# Patient Record
Sex: MALE | Race: WHITE
[De-identification: names, ages, dates, MRNs, and addresses within clinical notes are randomized per-mention and may not be internally consistent; named-entity substitution may affect disease eponyms.]

---

## 2019-08-27 ENCOUNTER — APPOINTMENT (OUTPATIENT)
Dept: OTOLARYNGOLOGY | Facility: CLINIC | Age: 24
End: 2019-08-27
Payer: COMMERCIAL

## 2019-08-27 VITALS
DIASTOLIC BLOOD PRESSURE: 67 MMHG | RESPIRATION RATE: 15 BRPM | WEIGHT: 170 LBS | SYSTOLIC BLOOD PRESSURE: 147 MMHG | HEART RATE: 67 BPM | BODY MASS INDEX: 23.8 KG/M2 | OXYGEN SATURATION: 98 % | HEIGHT: 71 IN | TEMPERATURE: 97.8 F

## 2019-08-27 VITALS — HEIGHT: 71 IN | WEIGHT: 170 LBS | BODY MASS INDEX: 23.8 KG/M2

## 2019-08-27 DIAGNOSIS — Z86.79 PERSONAL HISTORY OF OTHER DISEASES OF THE CIRCULATORY SYSTEM: ICD-10-CM

## 2019-08-27 DIAGNOSIS — Z78.9 OTHER SPECIFIED HEALTH STATUS: ICD-10-CM

## 2019-08-27 DIAGNOSIS — Z87.09 PERSONAL HISTORY OF OTHER DISEASES OF THE RESPIRATORY SYSTEM: ICD-10-CM

## 2019-08-27 DIAGNOSIS — R06.83 SNORING: ICD-10-CM

## 2019-08-27 DIAGNOSIS — I51.9 HEART DISEASE, UNSPECIFIED: ICD-10-CM

## 2019-08-27 DIAGNOSIS — G47.9 SLEEP DISORDER, UNSPECIFIED: ICD-10-CM

## 2019-08-27 DIAGNOSIS — J34.2 DEVIATED NASAL SEPTUM: ICD-10-CM

## 2019-08-27 PROCEDURE — 31575 DIAGNOSTIC LARYNGOSCOPY: CPT

## 2019-08-27 PROCEDURE — 99203 OFFICE O/P NEW LOW 30 MIN: CPT | Mod: 25

## 2019-08-29 ENCOUNTER — APPOINTMENT (OUTPATIENT)
Dept: SLEEP CENTER | Facility: HOME HEALTH | Age: 24
End: 2019-08-29

## 2019-08-30 ENCOUNTER — OTHER (OUTPATIENT)
Age: 24
End: 2019-08-30

## 2019-08-30 ENCOUNTER — APPOINTMENT (OUTPATIENT)
Dept: SLEEP CENTER | Facility: HOME HEALTH | Age: 24
End: 2019-08-30
Payer: COMMERCIAL

## 2019-08-30 ENCOUNTER — OUTPATIENT (OUTPATIENT)
Dept: OUTPATIENT SERVICES | Facility: HOSPITAL | Age: 24
LOS: 1 days | End: 2019-08-30
Payer: COMMERCIAL

## 2019-08-30 DIAGNOSIS — G47.33 OBSTRUCTIVE SLEEP APNEA (ADULT) (PEDIATRIC): ICD-10-CM

## 2019-08-30 PROCEDURE — 95800 SLP STDY UNATTENDED: CPT

## 2019-08-30 PROCEDURE — 95800 SLP STDY UNATTENDED: CPT | Mod: 26

## 2019-09-03 DIAGNOSIS — G47.33 OBSTRUCTIVE SLEEP APNEA (ADULT) (PEDIATRIC): ICD-10-CM

## 2019-09-03 NOTE — HISTORY OF PRESENT ILLNESS
[de-identified] : 24 years old male patient with history of Persistent snoring and sleep disturbance for the past 6 years.  Patient is present today in the with C/O Snoring loud enough to disturb his sleep or that of others.  Awakening with a dry mouth or sore throat.  Loud snoring. Medium to Large Tonsils,  Lingual tonsil Hypertrophy, GERD injury, Turbinate Hypertrophy \par

## 2019-09-03 NOTE — REASON FOR VISIT
[Initial Consultation] : an initial consultation for [FreeTextEntry2] : Persistent snoring and sleep disturbance for the past 6 years.  Patient states his level of severity  is a level 8 out of 10 and it occurs constant.  Patient states nothing helps to improve or worsens his Persistent snoring and sleep disturbance for the past 6 years.

## 2019-09-03 NOTE — CONSULT LETTER
[Dear  ___] : Dear  [unfilled], [Consult Letter:] : I had the pleasure of evaluating your patient, [unfilled]. [Please see my note below.] : Please see my note below. [Consult Closing:] : Thank you very much for allowing me to participate in the care of this patient.  If you have any questions, please do not hesitate to contact me. [Sincerely,] : Sincerely, [DrMusa  ___] : Dr. BROWN [FreeTextEntry3] : Bruno Whitmore MD, FACS\par Professor of Otolaryngology, [employer] [facility] [employer] at [facility]\par Director, [facility], [facility]\par , [facility], [facility]\par

## 2019-09-03 NOTE — REVIEW OF SYSTEMS
[Patient Intake Form Reviewed] : Patient intake form was reviewed [Nasal Congestion] : nasal congestion [Problem Snoring] : problem snoring [As Noted in HPI] : as noted in HPI [Negative] : Endocrine [FreeTextEntry4] : Neck pain

## 2019-09-03 NOTE — PHYSICAL EXAM
[] : septum deviated bilaterally [Normal] : normal appearance, well groomed, well nourished, and in no acute distress [de-identified] :  Medium to Large Tonsils,  Lingual tonsil Hypertrophy, GERD injury, Turbinate Hypertrophy \par

## 2019-09-03 NOTE — PROCEDURE
[Deviated to the Lt] : deviated to the left [Congested] : congested [Image(s) Captured] : image(s) captured and filed [Flexible Endoscope] : examined with the flexible endoscope [Serial Number: ___] : Serial Number: [unfilled] [Topical Lidocaine] : topical lidocaine [Glottis Arytenoid Cartilages Erythema] : bilateral arytenoid ~M erythema [de-identified] :  Loud snoring. Medium to Large Tonsils,  Lingual tonsil Hypertrophy, GERD injury, Turbinate Hypertrophy  [de-identified] : Lingual Tonsil Hypertrophy

## 2019-09-06 PROBLEM — G47.33 OBSTRUCTIVE SLEEP APNEA: Status: ACTIVE | Noted: 2019-08-27

## 2020-03-11 ENCOUNTER — TRANSCRIPTION ENCOUNTER (OUTPATIENT)
Age: 25
End: 2020-03-11

## 2022-12-01 ENCOUNTER — APPOINTMENT (OUTPATIENT)
Dept: HUMAN REPRODUCTION | Facility: CLINIC | Age: 27
End: 2022-12-01

## 2023-05-05 ENCOUNTER — APPOINTMENT (OUTPATIENT)
Dept: OTOLARYNGOLOGY | Facility: CLINIC | Age: 28
End: 2023-05-05
Payer: COMMERCIAL

## 2023-05-05 ENCOUNTER — NON-APPOINTMENT (OUTPATIENT)
Age: 28
End: 2023-05-05

## 2023-05-05 VITALS
SYSTOLIC BLOOD PRESSURE: 118 MMHG | TEMPERATURE: 97.9 F | DIASTOLIC BLOOD PRESSURE: 80 MMHG | BODY MASS INDEX: 30.21 KG/M2 | HEART RATE: 77 BPM | WEIGHT: 160 LBS | HEIGHT: 61 IN

## 2023-05-05 DIAGNOSIS — H69.82 OTHER SPECIFIED DISORDERS OF EUSTACHIAN TUBE, LEFT EAR: ICD-10-CM

## 2023-05-05 DIAGNOSIS — H61.23 IMPACTED CERUMEN, BILATERAL: ICD-10-CM

## 2023-05-05 DIAGNOSIS — H92.02 OTALGIA, LEFT EAR: ICD-10-CM

## 2023-05-05 PROCEDURE — 31231 NASAL ENDOSCOPY DX: CPT

## 2023-05-05 PROCEDURE — 99204 OFFICE O/P NEW MOD 45 MIN: CPT | Mod: 25

## 2023-05-05 PROCEDURE — 69210 REMOVE IMPACTED EAR WAX UNI: CPT

## 2023-05-05 NOTE — END OF VISIT
[FreeTextEntry3] : I, Dr. Aguilar personally performed the evaluation and management (E/M) services including all necessary procedures, for this new patient. That E/M includes conducting the clinically appropriate initial history &/or exam, assessing all conditions, and establishing the plan of care. Today, my POWER, Karmen Gonzalez, was here to observe &/or participate in the visit & follow plan of care established by me.\par \par \par

## 2023-05-05 NOTE — PHYSICAL EXAM
[Midline] : trachea located in midline position [Normal] : no rashes [de-identified] : cerumen in the ear canals; once removed normal EACs

## 2023-05-05 NOTE — ASSESSMENT
[FreeTextEntry1] : His last visit patient had heart surgery had a septoplasty done several years ago doing well breathing fine ears are bothering him on examination massive cerumen impaction both sides once the wax was removed ears are perfectly normal reassured no further interventions indicated endoscopically good passageway nasal cavity.  He will follow-up with us annually or sooner if he needs us.

## 2023-05-05 NOTE — HISTORY OF PRESENT ILLNESS
[de-identified] : Patient comes in with left ear pain of the last few months. When he blows his nose he has more pain in the left ear and feels something that is "off" in that ear only. He does not note any change sin hearing, ringing in the ears or dizziness.  He does not get any seasonal allergies this time of year. He describes the pain as a dull ache that has been present constantly since it started, although mild improvement lately. \par He does not clean his ears at home with drops or use Qtips. \par Patient had septoplasty with Dr Aguilar several years ago. \par He does update us that he had a cardiac valve replaced since his last visit here years ago.

## 2023-08-09 ENCOUNTER — NON-APPOINTMENT (OUTPATIENT)
Age: 28
End: 2023-08-09